# Patient Record
Sex: MALE | Race: AMERICAN INDIAN OR ALASKA NATIVE | NOT HISPANIC OR LATINO
[De-identification: names, ages, dates, MRNs, and addresses within clinical notes are randomized per-mention and may not be internally consistent; named-entity substitution may affect disease eponyms.]

---

## 2023-09-25 ENCOUNTER — NON-APPOINTMENT (OUTPATIENT)
Age: 33
End: 2023-09-25

## 2023-09-25 PROBLEM — Z00.00 ENCOUNTER FOR PREVENTIVE HEALTH EXAMINATION: Status: ACTIVE | Noted: 2023-09-25

## 2023-09-28 ENCOUNTER — TRANSCRIPTION ENCOUNTER (OUTPATIENT)
Age: 33
End: 2023-09-28

## 2023-09-28 ENCOUNTER — NON-APPOINTMENT (OUTPATIENT)
Age: 33
End: 2023-09-28

## 2023-09-28 ENCOUNTER — APPOINTMENT (OUTPATIENT)
Dept: ORTHOPEDIC SURGERY | Facility: CLINIC | Age: 33
End: 2023-09-28
Payer: MEDICAID

## 2023-09-28 VITALS — HEIGHT: 71 IN | WEIGHT: 253 LBS | BODY MASS INDEX: 35.42 KG/M2

## 2023-09-28 PROCEDURE — 99203 OFFICE O/P NEW LOW 30 MIN: CPT | Mod: 25

## 2023-09-28 PROCEDURE — 73110 X-RAY EXAM OF WRIST: CPT | Mod: RT

## 2023-09-29 ENCOUNTER — INPATIENT (INPATIENT)
Facility: HOSPITAL | Age: 33
LOS: 0 days | Discharge: HOME | DRG: 512 | End: 2023-09-29
Attending: SPECIALIST | Admitting: SPECIALIST
Payer: MEDICAID

## 2023-09-29 ENCOUNTER — TRANSCRIPTION ENCOUNTER (OUTPATIENT)
Age: 33
End: 2023-09-29

## 2023-09-29 VITALS
DIASTOLIC BLOOD PRESSURE: 88 MMHG | HEIGHT: 71 IN | RESPIRATION RATE: 18 BRPM | OXYGEN SATURATION: 98 % | TEMPERATURE: 98 F | HEART RATE: 82 BPM | WEIGHT: 257.94 LBS | SYSTOLIC BLOOD PRESSURE: 130 MMHG

## 2023-09-29 VITALS
SYSTOLIC BLOOD PRESSURE: 131 MMHG | HEART RATE: 61 BPM | DIASTOLIC BLOOD PRESSURE: 85 MMHG | RESPIRATION RATE: 13 BRPM | OXYGEN SATURATION: 98 %

## 2023-09-29 DIAGNOSIS — S52.501A UNSPECIFIED FRACTURE OF THE LOWER END OF RIGHT RADIUS, INITIAL ENCOUNTER FOR CLOSED FRACTURE: ICD-10-CM

## 2023-09-29 DIAGNOSIS — S52.509A UNSPECIFIED FRACTURE OF THE LOWER END OF UNSPECIFIED RADIUS, INITIAL ENCOUNTER FOR CLOSED FRACTURE: ICD-10-CM

## 2023-09-29 LAB
ALBUMIN SERPL ELPH-MCNC: 3.8 G/DL — SIGNIFICANT CHANGE UP (ref 3.3–5)
ALP SERPL-CCNC: 86 U/L — SIGNIFICANT CHANGE UP (ref 30–120)
ALT FLD-CCNC: 86 U/L — HIGH (ref 10–60)
ANION GAP SERPL CALC-SCNC: 7 MMOL/L — SIGNIFICANT CHANGE UP (ref 5–17)
APTT BLD: 35.1 SEC — SIGNIFICANT CHANGE UP (ref 24.5–35.6)
AST SERPL-CCNC: 37 U/L — SIGNIFICANT CHANGE UP (ref 10–40)
BASOPHILS # BLD AUTO: 0.03 K/UL — SIGNIFICANT CHANGE UP (ref 0–0.2)
BASOPHILS NFR BLD AUTO: 0.4 % — SIGNIFICANT CHANGE UP (ref 0–2)
BILIRUB SERPL-MCNC: 0.9 MG/DL — SIGNIFICANT CHANGE UP (ref 0.2–1.2)
BLD GP AB SCN SERPL QL: SIGNIFICANT CHANGE UP
BUN SERPL-MCNC: 20 MG/DL — SIGNIFICANT CHANGE UP (ref 7–23)
CALCIUM SERPL-MCNC: 9.4 MG/DL — SIGNIFICANT CHANGE UP (ref 8.4–10.5)
CHLORIDE SERPL-SCNC: 103 MMOL/L — SIGNIFICANT CHANGE UP (ref 96–108)
CO2 SERPL-SCNC: 28 MMOL/L — SIGNIFICANT CHANGE UP (ref 22–31)
CREAT SERPL-MCNC: 1.1 MG/DL — SIGNIFICANT CHANGE UP (ref 0.5–1.3)
EGFR: 90 ML/MIN/1.73M2 — SIGNIFICANT CHANGE UP
EOSINOPHIL # BLD AUTO: 0.27 K/UL — SIGNIFICANT CHANGE UP (ref 0–0.5)
EOSINOPHIL NFR BLD AUTO: 3.5 % — SIGNIFICANT CHANGE UP (ref 0–6)
GLUCOSE SERPL-MCNC: 105 MG/DL — HIGH (ref 70–99)
HCT VFR BLD CALC: 45.6 % — SIGNIFICANT CHANGE UP (ref 39–50)
HGB BLD-MCNC: 14.5 G/DL — SIGNIFICANT CHANGE UP (ref 13–17)
IMM GRANULOCYTES NFR BLD AUTO: 0.1 % — SIGNIFICANT CHANGE UP (ref 0–0.9)
INR BLD: 0.96 RATIO — SIGNIFICANT CHANGE UP (ref 0.85–1.18)
LYMPHOCYTES # BLD AUTO: 2.72 K/UL — SIGNIFICANT CHANGE UP (ref 1–3.3)
LYMPHOCYTES # BLD AUTO: 35.4 % — SIGNIFICANT CHANGE UP (ref 13–44)
MCHC RBC-ENTMCNC: 26.8 PG — LOW (ref 27–34)
MCHC RBC-ENTMCNC: 31.8 GM/DL — LOW (ref 32–36)
MCV RBC AUTO: 84.1 FL — SIGNIFICANT CHANGE UP (ref 80–100)
MONOCYTES # BLD AUTO: 0.67 K/UL — SIGNIFICANT CHANGE UP (ref 0–0.9)
MONOCYTES NFR BLD AUTO: 8.7 % — SIGNIFICANT CHANGE UP (ref 2–14)
NEUTROPHILS # BLD AUTO: 3.99 K/UL — SIGNIFICANT CHANGE UP (ref 1.8–7.4)
NEUTROPHILS NFR BLD AUTO: 51.9 % — SIGNIFICANT CHANGE UP (ref 43–77)
NRBC # BLD: 0 /100 WBCS — SIGNIFICANT CHANGE UP (ref 0–0)
PLATELET # BLD AUTO: 250 K/UL — SIGNIFICANT CHANGE UP (ref 150–400)
POTASSIUM SERPL-MCNC: 3.7 MMOL/L — SIGNIFICANT CHANGE UP (ref 3.5–5.3)
POTASSIUM SERPL-SCNC: 3.7 MMOL/L — SIGNIFICANT CHANGE UP (ref 3.5–5.3)
PROT SERPL-MCNC: 7.4 G/DL — SIGNIFICANT CHANGE UP (ref 6–8.3)
PROTHROM AB SERPL-ACNC: 10.8 SEC — SIGNIFICANT CHANGE UP (ref 9.5–13)
RBC # BLD: 5.42 M/UL — SIGNIFICANT CHANGE UP (ref 4.2–5.8)
RBC # FLD: 12.9 % — SIGNIFICANT CHANGE UP (ref 10.3–14.5)
SODIUM SERPL-SCNC: 138 MMOL/L — SIGNIFICANT CHANGE UP (ref 135–145)
WBC # BLD: 7.69 K/UL — SIGNIFICANT CHANGE UP (ref 3.8–10.5)
WBC # FLD AUTO: 7.69 K/UL — SIGNIFICANT CHANGE UP (ref 3.8–10.5)

## 2023-09-29 PROCEDURE — 36415 COLL VENOUS BLD VENIPUNCTURE: CPT

## 2023-09-29 PROCEDURE — 85610 PROTHROMBIN TIME: CPT

## 2023-09-29 PROCEDURE — 80053 COMPREHEN METABOLIC PANEL: CPT

## 2023-09-29 PROCEDURE — 25609 OPTX DST RD XART FX/EP SEP3+: CPT | Mod: RT

## 2023-09-29 PROCEDURE — 85025 COMPLETE CBC W/AUTO DIFF WBC: CPT

## 2023-09-29 PROCEDURE — 76000 FLUOROSCOPY <1 HR PHYS/QHP: CPT

## 2023-09-29 PROCEDURE — 99285 EMERGENCY DEPT VISIT HI MDM: CPT | Mod: 25

## 2023-09-29 PROCEDURE — 86901 BLOOD TYPING SEROLOGIC RH(D): CPT

## 2023-09-29 PROCEDURE — 86850 RBC ANTIBODY SCREEN: CPT

## 2023-09-29 PROCEDURE — 93005 ELECTROCARDIOGRAM TRACING: CPT

## 2023-09-29 PROCEDURE — 93010 ELECTROCARDIOGRAM REPORT: CPT

## 2023-09-29 PROCEDURE — C1713: CPT

## 2023-09-29 PROCEDURE — 86900 BLOOD TYPING SEROLOGIC ABO: CPT

## 2023-09-29 PROCEDURE — 85730 THROMBOPLASTIN TIME PARTIAL: CPT

## 2023-09-29 DEVICE — SCREW CORTEX S-T 2.7X14MM: Type: IMPLANTABLE DEVICE | Status: FUNCTIONAL

## 2023-09-29 DEVICE — IMPLANTABLE DEVICE: Type: IMPLANTABLE DEVICE | Status: FUNCTIONAL

## 2023-09-29 DEVICE — SCREW CORTEX S-T 2.7X16MM: Type: IMPLANTABLE DEVICE | Status: FUNCTIONAL

## 2023-09-29 DEVICE — SCREW CORTEX S-T 2.7X30MM: Type: IMPLANTABLE DEVICE | Status: FUNCTIONAL

## 2023-09-29 DEVICE — K-WIRE SYNTHES TROCAR POINT 1.25MM X 150MM: Type: IMPLANTABLE DEVICE | Status: FUNCTIONAL

## 2023-09-29 RX ORDER — ONDANSETRON 8 MG/1
4 TABLET, FILM COATED ORAL ONCE
Refills: 0 | Status: DISCONTINUED | OUTPATIENT
Start: 2023-09-29 | End: 2023-09-29

## 2023-09-29 RX ORDER — OMEPRAZOLE 10 MG/1
1 CAPSULE, DELAYED RELEASE ORAL
Refills: 0 | DISCHARGE

## 2023-09-29 RX ORDER — SODIUM CHLORIDE 9 MG/ML
1000 INJECTION, SOLUTION INTRAVENOUS
Refills: 0 | Status: DISCONTINUED | OUTPATIENT
Start: 2023-09-29 | End: 2023-09-29

## 2023-09-29 RX ORDER — HYDROMORPHONE HYDROCHLORIDE 2 MG/ML
1 INJECTION INTRAMUSCULAR; INTRAVENOUS; SUBCUTANEOUS
Refills: 0 | Status: DISCONTINUED | OUTPATIENT
Start: 2023-09-29 | End: 2023-09-29

## 2023-09-29 RX ORDER — IBUPROFEN 200 MG
1 TABLET ORAL
Qty: 30 | Refills: 0
Start: 2023-09-29

## 2023-09-29 RX ORDER — OXYCODONE HYDROCHLORIDE 5 MG/1
5 TABLET ORAL ONCE
Refills: 0 | Status: DISCONTINUED | OUTPATIENT
Start: 2023-09-29 | End: 2023-09-29

## 2023-09-29 RX ORDER — HYDROMORPHONE HYDROCHLORIDE 2 MG/ML
0.5 INJECTION INTRAMUSCULAR; INTRAVENOUS; SUBCUTANEOUS
Refills: 0 | Status: DISCONTINUED | OUTPATIENT
Start: 2023-09-29 | End: 2023-09-29

## 2023-09-29 RX ORDER — CHLORHEXIDINE GLUCONATE 213 G/1000ML
1 SOLUTION TOPICAL ONCE
Refills: 0 | Status: COMPLETED | OUTPATIENT
Start: 2023-09-29 | End: 2023-09-29

## 2023-09-29 RX ORDER — OXYCODONE AND ACETAMINOPHEN 5; 325 MG/1; MG/1
1 TABLET ORAL
Qty: 10 | Refills: 0
Start: 2023-09-29

## 2023-09-29 RX ORDER — APREPITANT 80 MG/1
40 CAPSULE ORAL ONCE
Refills: 0 | Status: COMPLETED | OUTPATIENT
Start: 2023-09-29 | End: 2023-09-29

## 2023-09-29 RX ORDER — OMEPRAZOLE 10 MG/1
0 CAPSULE, DELAYED RELEASE ORAL
Refills: 0 | DISCHARGE

## 2023-09-29 RX ORDER — CEFAZOLIN SODIUM 1 G
2000 VIAL (EA) INJECTION ONCE
Refills: 0 | Status: COMPLETED | OUTPATIENT
Start: 2023-09-29 | End: 2023-09-29

## 2023-09-29 RX ADMIN — CHLORHEXIDINE GLUCONATE 1 APPLICATION(S): 213 SOLUTION TOPICAL at 11:03

## 2023-09-29 RX ADMIN — APREPITANT 40 MILLIGRAM(S): 80 CAPSULE ORAL at 11:02

## 2023-09-29 NOTE — H&P ADULT - PROBLEM SELECTOR PLAN 1
-admit to Dr. Daigle for planned ORIF right distal radius  -NPO  -pre op labs  -Anesthesia clearance needed  -pain control prn  -pre op abx   -NWB on right arm  -plan discussed with Dr. Daigle

## 2023-09-29 NOTE — BRIEF OPERATIVE NOTE - NSICDXBRIEFPROCEDURE_GEN_ALL_CORE_FT
PROCEDURES:  Open reduction and internal fixation (ORIF) of 3-part intra-articular fracture of distal radius 29-Sep-2023 15:15:33  Moshe Villafuerte

## 2023-09-29 NOTE — ED ADULT NURSE NOTE - NSFALLHARMRISKINTERV_ED_ALL_ED
Communicate risk of Fall with Harm to all staff, patient, and family/Encourage patient to sit up slowly, dangle for a short time, stand at bedside before walking/Monitor gait and stability/Monitor for mental status changes and reorient to person, place, and time, as needed/Move patient closer to nursing station/within visual sight of ED staff/Provide patient with walking aids/Provide visual cue: red socks, yellow wristband, yellow gown, etc/Reinforce activity limits and safety measures with patient and family/Review medications for side effects contributing to fall risk/Toileting schedule using arm’s reach rule for commode and bathroom/Use of alarms - bed, stretcher, chair and/or video monitoring/Bed in lowest position, wheels locked, appropriate side rails in place/Call bell, personal items and telephone in reach/Instruct patient to call for assistance before getting out of bed/chair/stretcher/Non-slip footwear applied when patient is off stretcher/[place] to call system/Physically safe environment - no spills, clutter or unnecessary equipment/Purposeful Proactive Rounding/Room/bathroom lighting operational, light cord in reach

## 2023-09-29 NOTE — BRIEF OPERATIVE NOTE - NSICDXBRIEFPOSTOP_GEN_ALL_CORE_FT
POST-OP DIAGNOSIS:  Fracture of distal end of right radius 29-Sep-2023 15:14:38  Moshe Villafuerte

## 2023-09-29 NOTE — ED ADULT NURSE NOTE - OBJECTIVE STATEMENT
pt presented with c/o right arm fracture, reports he fell and fractured the arm, is here for surgery today, NPO since 10 PM last night, pt was at the orthopedics office yesterday.

## 2023-09-29 NOTE — ED PROVIDER NOTE - CLINICAL SUMMARY MEDICAL DECISION MAKING FREE TEXT BOX
33 yo M who is for the OR today for a displaced distal radial fracture on the right.   Pre-op labs  Dr. Oxana cook

## 2023-09-29 NOTE — H&P ADULT - MUSCULOSKELETAL COMMENTS
see HPI swelling and radial deviation to right wrist. TTP over the distal radius. ROM not tested. SILT distally. Cap refill <3 seconds. +2 radial pulse. median/ulnar/radial/AIN/PIN nerve intact.

## 2023-09-29 NOTE — ASU DISCHARGE PLAN (ADULT/PEDIATRIC) - CARE PROVIDER_API CALL
Abhijeet Daigle  Orthopaedic Surgery  [address], [address]  [place] [address]  Phone: [phone]  Fax: [phone]  Follow Up Time:

## 2023-09-29 NOTE — ED ADULT NURSE NOTE - CAS DISCH BELONGINGS RETURNED
pt belongings in bag brought to pre-op as okay by pre-op, pt denies any valuables that need safe lock up

## 2023-09-29 NOTE — PATIENT PROFILE ADULT - FALL HARM RISK - RISK INTERVENTIONS

## 2023-09-29 NOTE — ED PROVIDER NOTE - OBJECTIVE STATEMENT
31 yo M with hx of GERD presents for OR today for R distal radius fracture. States he saw Dr. Daigle yesterday in the office and advised surgery today. Pt reports injury was 6 months ago. Last PO intake 10pm last night

## 2023-09-29 NOTE — H&P ADULT - HISTORY OF PRESENT ILLNESS
pt is a32 year old RHD male who with no signficant PMHx who presents with right wrist pain. Pt states that he fell on his outstretched hand on 9/4. He was seen at a different facility but decided to see a different surgeon and was told mixed messages regarding surgery. Pain is worse with movement. pain is currently 5/10. He was seen yesterday by Dr. Daigle who recommended surgery. Denies CP/SOB/N/V/parasthesias. He has not had anything to eat or drink since 10pm last night.

## 2023-10-02 PROBLEM — Z78.9 OTHER SPECIFIED HEALTH STATUS: Chronic | Status: ACTIVE | Noted: 2023-09-29

## 2023-10-10 ENCOUNTER — APPOINTMENT (OUTPATIENT)
Dept: ORTHOPEDIC SURGERY | Facility: CLINIC | Age: 33
End: 2023-10-10
Payer: MEDICAID

## 2023-10-10 PROCEDURE — 99024 POSTOP FOLLOW-UP VISIT: CPT

## 2023-10-10 PROCEDURE — 29075 APPL CST ELBW FNGR SHORT ARM: CPT | Mod: 58,RT

## 2023-10-10 PROCEDURE — 73110 X-RAY EXAM OF WRIST: CPT | Mod: RT

## 2023-11-07 ENCOUNTER — APPOINTMENT (OUTPATIENT)
Dept: ORTHOPEDIC SURGERY | Facility: CLINIC | Age: 33
End: 2023-11-07
Payer: MEDICAID

## 2023-11-07 DIAGNOSIS — S52.501A UNSPECIFIED FRACTURE OF THE LOWER END OF RIGHT RADIUS, INITIAL ENCOUNTER FOR CLOSED FRACTURE: ICD-10-CM

## 2023-11-07 PROCEDURE — 99024 POSTOP FOLLOW-UP VISIT: CPT

## 2023-11-07 PROCEDURE — 73110 X-RAY EXAM OF WRIST: CPT | Mod: RT

## 2023-11-16 ENCOUNTER — APPOINTMENT (OUTPATIENT)
Dept: ORTHOPEDIC SURGERY | Facility: CLINIC | Age: 33
End: 2023-11-16

## 2023-12-19 ENCOUNTER — APPOINTMENT (OUTPATIENT)
Dept: ORTHOPEDIC SURGERY | Facility: CLINIC | Age: 33
End: 2023-12-19

## 2025-02-27 ENCOUNTER — NON-APPOINTMENT (OUTPATIENT)
Age: 35
End: 2025-02-27

## 2025-03-07 ENCOUNTER — APPOINTMENT (OUTPATIENT)
Dept: ORTHOPEDIC SURGERY | Facility: CLINIC | Age: 35
End: 2025-03-07

## (undated) DEVICE — GLV 7.5 PROTEXIS (WHITE)

## (undated) DEVICE — VENODYNE/SCD SLEEVE CALF MEDIUM

## (undated) DEVICE — DRAPE TOWEL BLUE 17" X 24"

## (undated) DEVICE — DRSG STERISTRIPS 0.5 X 4"

## (undated) DEVICE — DRILL BIT SYNTHES ORTHO QC 2X100MM

## (undated) DEVICE — GLV 8 PROTEXIS (BLUE)

## (undated) DEVICE — DRAPE C ARM UNIVERSAL

## (undated) DEVICE — DRAPE 3/4 SHEET 52X76"

## (undated) DEVICE — TOURNIQUET ESMARK 4"

## (undated) DEVICE — TOURNIQUET CUFF 18" DUAL PORT SINGLE BLADDER LUER LOCK (BLACK)

## (undated) DEVICE — SUT MONOCRYL 4-0 27" PS-2 UNDYED

## (undated) DEVICE — SUT VICRYL 3-0 27" RB-1 UNDYED

## (undated) DEVICE — DRSG MASTISOL

## (undated) DEVICE — WARMING BLANKET LOWER ADULT

## (undated) DEVICE — DRAPE C ARM 41X140"

## (undated) DEVICE — DRSG DERMABOND 0.7ML

## (undated) DEVICE — POSITIONER BOOT CRADLE

## (undated) DEVICE — DRILL BIT SYNTHES ORTHO 1.8MM

## (undated) DEVICE — PACK UPPER EXTREMITY